# Patient Record
Sex: FEMALE | Race: ASIAN | URBAN - METROPOLITAN AREA
[De-identification: names, ages, dates, MRNs, and addresses within clinical notes are randomized per-mention and may not be internally consistent; named-entity substitution may affect disease eponyms.]

---

## 2023-05-01 ENCOUNTER — NURSE ONLY (OUTPATIENT)
Dept: FAMILY MEDICINE CLINIC | Facility: CLINIC | Age: 33
End: 2023-05-01

## 2023-05-01 DIAGNOSIS — Z11.1 SCREENING FOR TUBERCULOSIS: Primary | ICD-10-CM

## 2023-05-01 NOTE — PROGRESS NOTES
Pt here for ppd testing. Ppd placed per immunization record. Instructions on return guidelines given.

## 2023-05-04 ENCOUNTER — OFFICE VISIT (OUTPATIENT)
Dept: FAMILY MEDICINE CLINIC | Facility: CLINIC | Age: 33
End: 2023-05-04

## 2023-05-04 DIAGNOSIS — Z11.1 ENCOUNTER FOR PPD SKIN TEST READING: Primary | ICD-10-CM

## 2023-05-04 LAB — INDURATION (): 0 MM (ref 0–11)

## (undated) NOTE — LETTER
May 4, 2023    Joseph Quick      Dear Sanjuana Mcgregor: The following are the results of your recent tests. Please review the list of test results. Your result is the value on the left; we have also supplied the range of normal (low and high) values.     Results for orders placed or performed in visit on 05/01/23   TB INTRADERMAL TEST   Result Value Ref Range    Date Given: 05/01/23     Site: right forearm     INDURATION (PPD) 0.0 0.0 - 11 mm       Please call if you have further questions,    Ron Griffiths MA

## (undated) NOTE — LETTER
May 4, 2023    Joseph Quick      Dear Shilpi Commander: The following are the results of your recent tests. Please review the list of test results. Your result is the value on the left; we have also supplied the range of normal (low and high) values. No results found for this or any previous visit.     Please call if you have further questions,    ***

## (undated) NOTE — LETTER
05/01/23    You will need to return to clinic in 48-72 hours to have results of TB test read. Please return to clinic on 5/3/23 after 1:15 pm or on 5/4/23 before 1:15 pm to have your TB test read. If you do not return during this time your test will need to be repeated.      Our clinic hours are:  Monday-Friday        8:00 am to 7:30 pm  Saturday/Sunday    9:00 am to 4:30 pm